# Patient Record
Sex: FEMALE | Race: OTHER | NOT HISPANIC OR LATINO | Employment: FULL TIME | URBAN - METROPOLITAN AREA
[De-identification: names, ages, dates, MRNs, and addresses within clinical notes are randomized per-mention and may not be internally consistent; named-entity substitution may affect disease eponyms.]

---

## 2020-10-18 ENCOUNTER — APPOINTMENT (OUTPATIENT)
Dept: RADIOLOGY | Facility: MEDICAL CENTER | Age: 55
DRG: 177 | End: 2020-10-18
Attending: EMERGENCY MEDICINE
Payer: COMMERCIAL

## 2020-10-18 ENCOUNTER — HOSPITAL ENCOUNTER (INPATIENT)
Facility: MEDICAL CENTER | Age: 55
LOS: 2 days | DRG: 177 | End: 2020-10-20
Attending: EMERGENCY MEDICINE | Admitting: HOSPITALIST
Payer: COMMERCIAL

## 2020-10-18 DIAGNOSIS — R09.02 HYPOXIA: ICD-10-CM

## 2020-10-18 DIAGNOSIS — R53.1 WEAKNESS: ICD-10-CM

## 2020-10-18 DIAGNOSIS — U07.1 COVID-19 VIRUS INFECTION: ICD-10-CM

## 2020-10-18 PROBLEM — R73.9 HYPERGLYCEMIA: Status: ACTIVE | Noted: 2020-10-18

## 2020-10-18 PROBLEM — J12.82 PNEUMONIA DUE TO COVID-19 VIRUS: Status: ACTIVE | Noted: 2020-10-18

## 2020-10-18 PROBLEM — E87.1 HYPONATREMIA: Status: ACTIVE | Noted: 2020-10-18

## 2020-10-18 LAB
ALBUMIN SERPL BCP-MCNC: 3.7 G/DL (ref 3.2–4.9)
ALBUMIN/GLOB SERPL: 1.2 G/DL
ALP SERPL-CCNC: 83 U/L (ref 30–99)
ALT SERPL-CCNC: 18 U/L (ref 2–50)
ANION GAP SERPL CALC-SCNC: 10 MMOL/L (ref 7–16)
AST SERPL-CCNC: 16 U/L (ref 12–45)
BASOPHILS # BLD AUTO: 0.1 % (ref 0–1.8)
BASOPHILS # BLD: 0.01 K/UL (ref 0–0.12)
BILIRUB SERPL-MCNC: 0.4 MG/DL (ref 0.1–1.5)
BUN SERPL-MCNC: 8 MG/DL (ref 8–22)
CALCIUM SERPL-MCNC: 8.6 MG/DL (ref 8.5–10.5)
CHLORIDE SERPL-SCNC: 98 MMOL/L (ref 96–112)
CO2 SERPL-SCNC: 25 MMOL/L (ref 20–33)
CREAT SERPL-MCNC: 0.54 MG/DL (ref 0.5–1.4)
CRP SERPL HS-MCNC: 18.05 MG/DL (ref 0–0.75)
D DIMER PPP IA.FEU-MCNC: 0.42 UG/ML (FEU) (ref 0–0.5)
EOSINOPHIL # BLD AUTO: 0.02 K/UL (ref 0–0.51)
EOSINOPHIL NFR BLD: 0.2 % (ref 0–6.9)
ERYTHROCYTE [DISTWIDTH] IN BLOOD BY AUTOMATED COUNT: 42.5 FL (ref 35.9–50)
GLOBULIN SER CALC-MCNC: 3.2 G/DL (ref 1.9–3.5)
GLUCOSE SERPL-MCNC: 114 MG/DL (ref 65–99)
HCT VFR BLD AUTO: 41.2 % (ref 37–47)
HGB BLD-MCNC: 13.8 G/DL (ref 12–16)
IMM GRANULOCYTES # BLD AUTO: 0.04 K/UL (ref 0–0.11)
IMM GRANULOCYTES NFR BLD AUTO: 0.5 % (ref 0–0.9)
LACTATE BLD-SCNC: 1.2 MMOL/L (ref 0.5–2)
LYMPHOCYTES # BLD AUTO: 1.24 K/UL (ref 1–4.8)
LYMPHOCYTES NFR BLD: 14.5 % (ref 22–41)
MCH RBC QN AUTO: 29.9 PG (ref 27–33)
MCHC RBC AUTO-ENTMCNC: 33.5 G/DL (ref 33.6–35)
MCV RBC AUTO: 89.2 FL (ref 81.4–97.8)
MONOCYTES # BLD AUTO: 0.32 K/UL (ref 0–0.85)
MONOCYTES NFR BLD AUTO: 3.7 % (ref 0–13.4)
NEUTROPHILS # BLD AUTO: 6.93 K/UL (ref 2–7.15)
NEUTROPHILS NFR BLD: 81 % (ref 44–72)
NRBC # BLD AUTO: 0 K/UL
NRBC BLD-RTO: 0 /100 WBC
PLATELET # BLD AUTO: 196 K/UL (ref 164–446)
PMV BLD AUTO: 9.2 FL (ref 9–12.9)
POTASSIUM SERPL-SCNC: 3.9 MMOL/L (ref 3.6–5.5)
PROCALCITONIN SERPL-MCNC: <0.05 NG/ML
PROT SERPL-MCNC: 6.9 G/DL (ref 6–8.2)
RBC # BLD AUTO: 4.62 M/UL (ref 4.2–5.4)
SODIUM SERPL-SCNC: 133 MMOL/L (ref 135–145)
WBC # BLD AUTO: 8.6 K/UL (ref 4.8–10.8)

## 2020-10-18 PROCEDURE — 86140 C-REACTIVE PROTEIN: CPT

## 2020-10-18 PROCEDURE — 770020 HCHG ROOM/CARE - TELE (206)

## 2020-10-18 PROCEDURE — 80053 COMPREHEN METABOLIC PANEL: CPT

## 2020-10-18 PROCEDURE — 99222 1ST HOSP IP/OBS MODERATE 55: CPT | Performed by: HOSPITALIST

## 2020-10-18 PROCEDURE — 36415 COLL VENOUS BLD VENIPUNCTURE: CPT

## 2020-10-18 PROCEDURE — A9270 NON-COVERED ITEM OR SERVICE: HCPCS | Performed by: HOSPITALIST

## 2020-10-18 PROCEDURE — 83605 ASSAY OF LACTIC ACID: CPT

## 2020-10-18 PROCEDURE — 96372 THER/PROPH/DIAG INJ SC/IM: CPT

## 2020-10-18 PROCEDURE — 99285 EMERGENCY DEPT VISIT HI MDM: CPT

## 2020-10-18 PROCEDURE — 87040 BLOOD CULTURE FOR BACTERIA: CPT | Mod: 91

## 2020-10-18 PROCEDURE — 83520 IMMUNOASSAY QUANT NOS NONAB: CPT

## 2020-10-18 PROCEDURE — 85025 COMPLETE CBC W/AUTO DIFF WBC: CPT

## 2020-10-18 PROCEDURE — 71045 X-RAY EXAM CHEST 1 VIEW: CPT

## 2020-10-18 PROCEDURE — 700102 HCHG RX REV CODE 250 W/ 637 OVERRIDE(OP): Performed by: HOSPITALIST

## 2020-10-18 PROCEDURE — 84145 PROCALCITONIN (PCT): CPT

## 2020-10-18 PROCEDURE — 700111 HCHG RX REV CODE 636 W/ 250 OVERRIDE (IP): Performed by: HOSPITALIST

## 2020-10-18 PROCEDURE — 85379 FIBRIN DEGRADATION QUANT: CPT

## 2020-10-18 RX ORDER — ACETAMINOPHEN 325 MG/1
650 TABLET ORAL EVERY 6 HOURS PRN
Status: DISCONTINUED | OUTPATIENT
Start: 2020-10-18 | End: 2020-10-20 | Stop reason: HOSPADM

## 2020-10-18 RX ORDER — ACETAMINOPHEN 325 MG/1
650 TABLET ORAL EVERY 6 HOURS PRN
Status: DISCONTINUED | OUTPATIENT
Start: 2020-10-18 | End: 2020-10-18

## 2020-10-18 RX ORDER — POLYETHYLENE GLYCOL 3350 17 G/17G
1 POWDER, FOR SOLUTION ORAL
Status: DISCONTINUED | OUTPATIENT
Start: 2020-10-18 | End: 2020-10-20 | Stop reason: HOSPADM

## 2020-10-18 RX ORDER — DEXAMETHASONE 6 MG/1
6 TABLET ORAL DAILY
Status: DISCONTINUED | OUTPATIENT
Start: 2020-10-18 | End: 2020-10-20 | Stop reason: HOSPADM

## 2020-10-18 RX ORDER — PROMETHAZINE HYDROCHLORIDE 25 MG/1
12.5-25 TABLET ORAL EVERY 4 HOURS PRN
Status: DISCONTINUED | OUTPATIENT
Start: 2020-10-18 | End: 2020-10-20 | Stop reason: HOSPADM

## 2020-10-18 RX ORDER — ONDANSETRON 4 MG/1
4 TABLET, ORALLY DISINTEGRATING ORAL EVERY 4 HOURS PRN
Status: DISCONTINUED | OUTPATIENT
Start: 2020-10-18 | End: 2020-10-18

## 2020-10-18 RX ORDER — AMOXICILLIN 250 MG
2 CAPSULE ORAL 2 TIMES DAILY
Status: DISCONTINUED | OUTPATIENT
Start: 2020-10-18 | End: 2020-10-20 | Stop reason: HOSPADM

## 2020-10-18 RX ORDER — BISACODYL 10 MG
10 SUPPOSITORY, RECTAL RECTAL
Status: DISCONTINUED | OUTPATIENT
Start: 2020-10-18 | End: 2020-10-20 | Stop reason: HOSPADM

## 2020-10-18 RX ORDER — ONDANSETRON 2 MG/ML
4 INJECTION INTRAMUSCULAR; INTRAVENOUS EVERY 4 HOURS PRN
Status: DISCONTINUED | OUTPATIENT
Start: 2020-10-18 | End: 2020-10-18

## 2020-10-18 RX ORDER — GUAIFENESIN/DEXTROMETHORPHAN 100-10MG/5
10 SYRUP ORAL EVERY 6 HOURS PRN
Status: DISCONTINUED | OUTPATIENT
Start: 2020-10-18 | End: 2020-10-20 | Stop reason: HOSPADM

## 2020-10-18 RX ORDER — ONDANSETRON 2 MG/ML
4 INJECTION INTRAMUSCULAR; INTRAVENOUS EVERY 6 HOURS PRN
Status: DISCONTINUED | OUTPATIENT
Start: 2020-10-18 | End: 2020-10-20 | Stop reason: HOSPADM

## 2020-10-18 RX ORDER — PROCHLORPERAZINE EDISYLATE 5 MG/ML
5-10 INJECTION INTRAMUSCULAR; INTRAVENOUS EVERY 4 HOURS PRN
Status: DISCONTINUED | OUTPATIENT
Start: 2020-10-18 | End: 2020-10-20 | Stop reason: HOSPADM

## 2020-10-18 RX ORDER — PROMETHAZINE HYDROCHLORIDE 25 MG/1
12.5-25 SUPPOSITORY RECTAL EVERY 4 HOURS PRN
Status: DISCONTINUED | OUTPATIENT
Start: 2020-10-18 | End: 2020-10-20 | Stop reason: HOSPADM

## 2020-10-18 RX ORDER — ONDANSETRON 4 MG/1
4 TABLET, ORALLY DISINTEGRATING ORAL EVERY 6 HOURS PRN
Status: DISCONTINUED | OUTPATIENT
Start: 2020-10-18 | End: 2020-10-20 | Stop reason: HOSPADM

## 2020-10-18 RX ADMIN — DEXAMETHASONE 6 MG: 6 TABLET ORAL at 12:31

## 2020-10-18 RX ADMIN — ACETAMINOPHEN 650 MG: 325 TABLET, FILM COATED ORAL at 20:53

## 2020-10-18 RX ADMIN — ENOXAPARIN SODIUM 40 MG: 40 INJECTION SUBCUTANEOUS at 12:31

## 2020-10-18 SDOH — HEALTH STABILITY: MENTAL HEALTH: HOW OFTEN DO YOU HAVE A DRINK CONTAINING ALCOHOL?: NEVER

## 2020-10-18 ASSESSMENT — COGNITIVE AND FUNCTIONAL STATUS - GENERAL
SUGGESTED CMS G CODE MODIFIER DAILY ACTIVITY: CH
MOBILITY SCORE: 24
DAILY ACTIVITIY SCORE: 24
SUGGESTED CMS G CODE MODIFIER MOBILITY: CH

## 2020-10-18 ASSESSMENT — LIFESTYLE VARIABLES
TOTAL SCORE: 0
TOTAL SCORE: 0
HAVE PEOPLE ANNOYED YOU BY CRITICIZING YOUR DRINKING: NO
TOTAL SCORE: 0
EVER FELT BAD OR GUILTY ABOUT YOUR DRINKING: NO
HOW MANY TIMES IN THE PAST YEAR HAVE YOU HAD 5 OR MORE DRINKS IN A DAY: 0
ALCOHOL_USE: NO
CONSUMPTION TOTAL: INCOMPLETE
CONSUMPTION TOTAL: NEGATIVE
HAVE PEOPLE ANNOYED YOU BY CRITICIZING YOUR DRINKING: NO
AVERAGE NUMBER OF DAYS PER WEEK YOU HAVE A DRINK CONTAINING ALCOHOL: 0
TOTAL SCORE: 0
HAVE YOU EVER FELT YOU SHOULD CUT DOWN ON YOUR DRINKING: NO
TOTAL SCORE: 0
EVER FELT BAD OR GUILTY ABOUT YOUR DRINKING: NO
EVER HAD A DRINK FIRST THING IN THE MORNING TO STEADY YOUR NERVES TO GET RID OF A HANGOVER: NO
DOES PATIENT WANT TO STOP DRINKING: NO
EVER HAD A DRINK FIRST THING IN THE MORNING TO STEADY YOUR NERVES TO GET RID OF A HANGOVER: NO
TOTAL SCORE: 0
DOES PATIENT WANT TO STOP DRINKING: NO
ON A TYPICAL DAY WHEN YOU DRINK ALCOHOL HOW MANY DRINKS DO YOU HAVE: 0
DO YOU DRINK ALCOHOL: NO
ALCOHOL_USE: NO
HAVE YOU EVER FELT YOU SHOULD CUT DOWN ON YOUR DRINKING: NO

## 2020-10-18 ASSESSMENT — PATIENT HEALTH QUESTIONNAIRE - PHQ9
1. LITTLE INTEREST OR PLEASURE IN DOING THINGS: NOT AT ALL
SUM OF ALL RESPONSES TO PHQ9 QUESTIONS 1 AND 2: 0
SUM OF ALL RESPONSES TO PHQ9 QUESTIONS 1 AND 2: 0
2. FEELING DOWN, DEPRESSED, IRRITABLE, OR HOPELESS: NOT AT ALL
1. LITTLE INTEREST OR PLEASURE IN DOING THINGS: NOT AT ALL
2. FEELING DOWN, DEPRESSED, IRRITABLE, OR HOPELESS: NOT AT ALL

## 2020-10-18 ASSESSMENT — PAIN DESCRIPTION - PAIN TYPE: TYPE: ACUTE PAIN

## 2020-10-18 NOTE — ED NOTES
Med Rec complete per phone interview with Pt's Son  Allergies Reviewed  No ABX in the last 14 days    Pt takes no medications

## 2020-10-18 NOTE — PROGRESS NOTES
Patient refused two RN skin assessment, educated regarding importance.  Education reinforced by COLE Simpson.  Patient continues to refuse.

## 2020-10-18 NOTE — PROGRESS NOTES
Isolation Precautions:    Patient is on Droplet, Contact and Eye protection isolation for COVID-19.    Patient educated on reason for isolation, how the infection may be transmitted, and how to help prevent transmission to others.     Patient educated that Droplet, Contact and Eye protection precautions involves staff and visitors wearing PPE, follow  Standard Precautions and perform meticulous hand hygiene in order to prevent transmission of infection. (Contact Precautions: gown and gloves; Special Contact Precautions: gown and gloves, with the added requirement of soap and water for hand hygiene; Droplet Precautions: surgical mask worn by staff and visitors in the room, and worn by the patient when out of the room; Airborne Precautions: involves staff wearing PPE to include an N95 Respirator or Controlled Air Purifying Respirator (CAPR).  Visitors should be limited and may wear an N95 mask).         Patient transport and mobilization on unit. Patient educated that they may leave their room, but prior to exiting, the patient needs to have on a fresh patient gown, ensure the potentially infectious area is covered, perform appropriate hand hygiene immediately prior to exiting the room. (*For Airborne Precautions: Patient educated that time out of the room should be minimized and limited to transport to diagnostic procedures or other activities. Patient is to wear surgical mask when required to leave the patient room).

## 2020-10-18 NOTE — PROGRESS NOTES
Pt arrived to unit A&Ox4 and on RA. VSS. Pt oriented to room and call bell. Spoke w/ patient's son Oskar and provided updates on the patient's health status. Pt ambulated to the bathroom steady on her feet. Continuous O2 and telemetry monitoring in use. Pt is comfortably sitting at bedside eating. Call bell within reach. Will continue to monitor.

## 2020-10-18 NOTE — PROGRESS NOTES
[facility] TRI[facility]IC[facility] ER REPORT  Consult/Admission requested by: Dr Davalos   Chief complaint:SOB   Pertinent history/ER Course:   Hx of Covid test +7 days, presented with worsening weakness and shortness of breath, pulse oximeter at home showed saturation less than 90s, chest x-ray showed bilateral infiltration, [facility]    Code Status: Full per ERP, I personally verified with the ERP patient's code status and the ERP has confirmed this with the patient.   Patient meets admission criterion: Yes..  Recommendations given or work up & consultations requested per triage officer: None  Consultants involved and pertinent input from consultants: None  Admission status: Inpatient.   Admission order placed: Yes.   Floor requested: COVID-19 floor  Assigned hospitalist: Dr Sarah Posadas

## 2020-10-18 NOTE — LETTER
10/23/2020               Raymon Damico  [address]  [facility] [address]        Dear Raymon (MR#[id number]),    This letter is to inform you that your COVID-19 test result is POSITIVE.  This means that the virus that causes COVID-19 was found in your sample.      The Health Department will be in contact with you soon.      You are encouraged to continue to quarantine and self-isolate according to the CDC guidance unless otherwise directed by the Health Department.  Per the CDC, you should continue to quarantine until at least 10 days have passed since your symptoms first appeared and at least 24 hours have passed since your fever resolved without the use of fever-reducing medications. Your other symptoms of COVID-19 should also be improving (loss of taste and smell may persist for weeks or months after recovery and should not delay the end of isolation).    Once any symptoms have resolved, it may be possible to donate plasma to help others that are currently ill with COVID-19. To learn more and apply, please contact the  at [phone] or via e-mail at [email or web address].    For any further questions regarding COVID-19, please contact the [facility] at [phone].  Thank you for your cooperation in the matter.      Sincerely,      The [facility] Care Team

## 2020-10-18 NOTE — ED NOTES
Medicated per MAR. Son updated regarding POC, pt status, and all questions addressed via telephone with pt permission.

## 2020-10-18 NOTE — H&P
Hospital Medicine History & Physical Note    Date of Service  10/18/2020    Primary Care Physician  Paty Hannon M.D. (Inactive)    Consultants      Code Status  Full Code    Chief Complaint  Chief Complaint   Patient presents with   • Hypoxemia   • Coronavirus Screening       History of Presenting Illness  55 y.o. female who presented 10/18/2020 with recent diagnosis of COVID-19 8 days ago has been having cough mild dyspnea was monitoring her pulse oximetry at home and noticed last night that her oxygen saturation dropped to 80 to 84%.  She denies any chest pain.  Cough is nonproductive.  No hemoptysis.  She is concerned about her hypoxia and presented to the emergency department.  She is afebrile no chills.  No diarrhea.  No nausea vomiting    Review of Systems  Review of Systems   All other systems reviewed and are negative.      Past Medical History  Negative per patient    Surgical History  Negative per patient    Family History  Reviewed not pertinent to the presenting problem    Social History   reports that she has never smoked. She has never used smokeless tobacco. She reports that she does not drink alcohol or use drugs.    Allergies  Allergies   Allergen Reactions   • Aspirin Itching and Swelling       Medications  None       Physical Exam  Temp:  [37.1 °C (98.7 °F)] 37.1 °C (98.7 °F)  Pulse:  [71-87] 71  Resp:  [16-35] 22  BP: (111-123)/(57-65) 116/58  SpO2:  [90 %-95 %] 95 %    Physical Exam  Vitals signs and nursing note reviewed.   Constitutional:       General: She is not in acute distress.  HENT:      Head: Normocephalic and atraumatic.      Nose: Nose normal.      Mouth/Throat:      Pharynx: No oropharyngeal exudate or posterior oropharyngeal erythema.   Eyes:      General:         Right eye: No discharge.         Left eye: No discharge.   Neck:      Musculoskeletal: Neck supple.   Cardiovascular:      Rate and Rhythm: Normal rate and regular rhythm.      Heart sounds: No murmur. No friction  rub. No gallop.    Pulmonary:      Effort: Pulmonary effort is normal. No respiratory distress.      Breath sounds: No stridor. Decreased breath sounds present. No wheezing or rhonchi.   Chest:      Chest wall: No tenderness.   Abdominal:      General: Bowel sounds are normal. There is no distension.      Palpations: Abdomen is soft. There is no mass.      Tenderness: There is no abdominal tenderness. There is no guarding.   Musculoskeletal:         General: No swelling or tenderness.   Skin:     General: Skin is warm and dry.      Coloration: Skin is not cyanotic.      Nails: There is no clubbing.     Neurological:      General: No focal deficit present.      Mental Status: She is alert and oriented to person, place, and time.      Cranial Nerves: No cranial nerve deficit.      Motor: No weakness.   Psychiatric:         Mood and Affect: Mood normal.         Behavior: Behavior normal.         Laboratory:  Recent Labs     10/18/20  0818   WBC 8.6   RBC 4.62   HEMOGLOBIN 13.8   HEMATOCRIT 41.2   MCV 89.2   MCH 29.9   MCHC 33.5*   RDW 42.5   PLATELETCT 196   MPV 9.2     Recent Labs     10/18/20  0818   SODIUM 133*   POTASSIUM 3.9   CHLORIDE 98   CO2 25   GLUCOSE 114*   BUN 8   CREATININE 0.54   CALCIUM 8.6     Recent Labs     10/18/20  0818   ALTSGPT 18   ASTSGOT 16   ALKPHOSPHAT 83   TBILIRUBIN 0.4   GLUCOSE 114*         No results for input(s): NTPROBNP in the last 72 hours.      No results for input(s): TROPONINT in the last 72 hours.    Imaging:  DX-CHEST-PORTABLE (1 VIEW)   Final Result         Diffuse interstitial prominence and hazy opacity throughout both lungs could relate to atypical infection such as Covid 19 pneumonia            Assessment/Plan:  I anticipate this patient will require at least two midnights for appropriate medical management, necessitating inpatient admission.    * Pneumonia due to COVID-19 virus  Assessment & Plan  Given her hypoxia and duration of symptoms will start on Decadron 6 mg  daily  Close clinical monitoring and supportive care  Monitor pulse oximetry and oxygen supplementation to keep saturation greater than 90%      Hyponatremia  Assessment & Plan  Mild given COVID pneumonia avoid excessive IV hydration will monitor levels    Hyperglycemia  Assessment & Plan  Check fasting level    Hypoxia  Assessment & Plan  Her oxygen saturation dropped to the low 80s she is currently improved and is off oxygen  Close clinical monitoring

## 2020-10-18 NOTE — ED PROVIDER NOTES
ED Provider Note    Scribed for Law Davalos M.D. by Krystyna Dawson. 10/18/2020  8:18 AM    Primary care provider: Paty Hannon M.D. (Inactive)  Means of arrival: Walk in  History obtained from: Patient  History limited by: None    CHIEF COMPLAINT  Chief Complaint   Patient presents with   • Hypoxemia   • Coronavirus Screening       [facility]  Raymon Damico is a 55 y.o. female who presents to the Emergency Department for ongoing shortness of breath with an onset of one week ago, but worsening the last few days. The patient notes she was tested positive for COVID-19 approximately 7-8 days ago. She adds she has been measuring her oxygen saturation at home and noticed her oxygen decreased to 80 to 84 percent at home. She also notes she has been having difficulty breathing out of her nose. Breathing out of her mouth is a mild alleviating factor. Exertion is an exacerbating factor. She reports associated loss of smell and taste, and generalized weakness. She denies any associated diarrhea, or fever.     I verified that the patient was wearing a mask and I was wearing appropriate PPE every time I entered the room. The patient's mask was on the patient at all times during my encounter except for a brief view of the oropharynx.    REVIEW OF SYSTEMS  Pertinent negatives include no diarrhea, or fever. As above, all other systems reviewed and are negative.   See HPI for further details.     PAST MEDICAL HISTORY   None noted    SURGICAL HISTORY  patient denies any surgical history    SOCIAL HISTORY  Social History     Tobacco Use   • Smoking status: None noted   Substance Use Topics   • Alcohol use: None noted   • Drug use: None noted      Social History     Substance and Sexual Activity   Drug Use None noted       FAMILY HISTORY  None noted    CURRENT MEDICATIONS  Home Medications     Reviewed by Jostin Kwong (Pharmacy Tech) on 10/18/20 at 0929  Med List Status: Complete   Medication Last Dose Status       "  Patient Greg Taking any Medications                       ALLERGIES  Allergies   Allergen Reactions   • Aspirin Itching and Swelling       PHYSICAL EXAM  VITAL SIGNS: /65   Pulse 87   Temp 37.1 °C (98.7 °F) (Temporal)   Resp 16   Ht 1.651 m (5' 5\")   Wt 77.1 kg (169 lb 15.6 oz)   SpO2 92%   BMI 28.29 kg/m²     Constitutional: Well developed, Well nourished, Comfortable appearing, No acute distress, Non-toxic appearance.   HENT: Normocephalic, Atraumatic, Bilateral external ears normal, Oropharynx is clear mucous membranes are moist. No oral exudates or nasal discharge.   Eyes: Pupils are equal round and reactive, EOMI, Conjunctiva normal, No discharge.   Neck: Normal range of motion, No tenderness, Supple, No stridor. No meningismus.  Lymphatic: No lymphadenopathy noted.   Cardiovascular: Regular rate and rhythm without murmur rub or gallop.  Thorax & Lungs: Slight increase work of breathing, Clear breath sounds bilaterally without wheezes, rhonchi or rales. There is no chest wall tenderness.   Abdomen: Soft non-tender non-distended. There is no rebound or guarding. No organomegaly is appreciated. Bowel sounds are normal.  Skin: Normal without rash.   Back: No CVA or spinal tenderness.   Extremities: Intact distal pulses, No edema, No tenderness, No cyanosis, No clubbing. Capillary refill is less than 2 seconds.  Musculoskeletal: Good range of motion in all major joints. No tenderness to palpation or major deformities noted.   Neurologic: Alert & oriented x 3, Normal motor function, Normal sensory function, No focal deficits noted. Reflexes are normal.  Psychiatric: Affect normal, Judgment normal, Mood normal. There is no suicidal ideation or patient reported hallucinations.       DIAGNOSTIC STUDIES / PROCEDURES    LABS  Labs Reviewed   CBC WITH DIFFERENTIAL - Abnormal; Notable for the following components:       Result Value    MCHC 33.5 (*)     Neutrophils-Polys 81.00 (*)     Lymphocytes 14.50 (*)  " "   All other components within normal limits   COMP METABOLIC PANEL - Abnormal; Notable for the following components:    Sodium 133 (*)     Glucose 114 (*)     All other components within normal limits   LACTIC ACID   BLOOD CULTURE    Narrative:     Per Hospital Policy: Only change Specimen Src: to \"Line\" if  specified by physician order.   BLOOD CULTURE    Narrative:     Per Hospital Policy: Only change Specimen Src: to \"Line\" if  specified by physician order.   ESTIMATED GFR   LACTIC ACID   LACTIC ACID   URINALYSIS   URINE CULTURE(NEW)   D-DIMER   CRP QUANTITIVE (NON-CARDIAC)   PROCALCITONIN   INTERLEUKIN 6   :   All labs reviewed by me.    RADIOLOGY  DX-CHEST-PORTABLE (1 VIEW)   Final Result         Diffuse interstitial prominence and hazy opacity throughout both lungs could relate to atypical infection such as Covid 19 pneumonia        The radiologist's interpretation of all radiological studies have been reviewed by me.    COURSE & MEDICAL DECISION MAKING  Nursing notes, VS, PMSFHx reviewed in chart.    8:18 AM Patient seen and examined at bedside. Discussed plan of care with patient. I informed them that labs and imaging will be ordered to evaluate symptoms. Patient is understanding and agreeable with plan. Ordered for labs and DX chest to evaluate.  There was no need to do additional coronavirus testing as the patient is known positive a week ago    8:50 AM - Reviewed lab results which showed a lactate of 1.2.  She is not in significant distress and chest x-ray shows diffuse interstitial prominence and hazy opacities consistent with atypical pneumonia.  I think this is advancing coronavirus illness    Laboratory evaluation reveals no leukocytosis, minimal shift with 81% PMNs and no evidence of acidosis, renal or liver dysfunction and unremarkable electrolytes with only a slightly low sodium at 133    9:18 AM - Paged hospitalist.     9:39 AM - I discussed the patient's case and the above findings with  " Renee (hospitalist) who agreed to evaluate the patient for hospitalization.  Patient understands her need for hospitalization.  She understands that her coronavirus infection is likely going to get worse before it improves and we are hospitalizing her for ongoing close monitoring    PPE Note: I personally donned full PPE for all patient encounters during this visit, including being clean-shaven with an N95 respirator mask, gloves, gown, and goggles.     Scribe remained outside the patient's room and did not have any contact with the patient for the duration of patient encounter.       DISPOSITION:  Patient will be hospitalized by Dr. Duran in guarded condition.    FINAL IMPRESSION  1. COVID-19 virus infection    2. Hypoxia    3. Weakness          I, Krystyna Dawson (Scribe), am scribing for, and in the presence of, Law Davalos M.D..    Electronically signed by: Krystyna Dawson (Scribe), 10/18/2020    ILaw M.D. personally performed the services described in this documentation, as scribed by Krystyna Dawson in my presence, and it is both accurate and complete.    C    The note accurately reflects work and decisions made by me.  Law Davalos M.D.  10/18/2020  10:15 AM

## 2020-10-18 NOTE — ASSESSMENT & PLAN NOTE
Continue decadron  Vitamin D levels pending  D dimer low, no indication for full anticoagulation  Zinc, vitamin C, pepcid and melatonin started for immune support  Continue supportive care

## 2020-10-19 LAB
25(OH)D3 SERPL-MCNC: 41 NG/ML (ref 30–100)
COVID ORDER STATUS COVID19: NORMAL
SARS-COV-2 RNA RESP QL NAA+PROBE: DETECTED
SPECIMEN SOURCE: ABNORMAL

## 2020-10-19 PROCEDURE — C9803 HOPD COVID-19 SPEC COLLECT: HCPCS | Performed by: HOSPITALIST

## 2020-10-19 PROCEDURE — 99232 SBSQ HOSP IP/OBS MODERATE 35: CPT | Performed by: HOSPITALIST

## 2020-10-19 PROCEDURE — 82306 VITAMIN D 25 HYDROXY: CPT

## 2020-10-19 PROCEDURE — A9270 NON-COVERED ITEM OR SERVICE: HCPCS | Performed by: HOSPITALIST

## 2020-10-19 PROCEDURE — U0003 INFECTIOUS AGENT DETECTION BY NUCLEIC ACID (DNA OR RNA); SEVERE ACUTE RESPIRATORY SYNDROME CORONAVIRUS 2 (SARS-COV-2) (CORONAVIRUS DISEASE [COVID-19]), AMPLIFIED PROBE TECHNIQUE, MAKING USE OF HIGH THROUGHPUT TECHNOLOGIES AS DESCRIBED BY CMS-2020-01-R: HCPCS

## 2020-10-19 PROCEDURE — 770020 HCHG ROOM/CARE - TELE (206)

## 2020-10-19 PROCEDURE — 700102 HCHG RX REV CODE 250 W/ 637 OVERRIDE(OP): Performed by: HOSPITALIST

## 2020-10-19 PROCEDURE — 36415 COLL VENOUS BLD VENIPUNCTURE: CPT

## 2020-10-19 PROCEDURE — 700111 HCHG RX REV CODE 636 W/ 250 OVERRIDE (IP): Performed by: HOSPITALIST

## 2020-10-19 RX ORDER — ZINC SULFATE 50(220)MG
220 CAPSULE ORAL DAILY
Status: DISCONTINUED | OUTPATIENT
Start: 2020-10-19 | End: 2020-10-20 | Stop reason: HOSPADM

## 2020-10-19 RX ORDER — CHOLECALCIFEROL (VITAMIN D3) 125 MCG
5 CAPSULE ORAL NIGHTLY
Status: DISCONTINUED | OUTPATIENT
Start: 2020-10-19 | End: 2020-10-20 | Stop reason: HOSPADM

## 2020-10-19 RX ORDER — ASCORBIC ACID 500 MG
1000 TABLET ORAL 2 TIMES DAILY
Status: DISCONTINUED | OUTPATIENT
Start: 2020-10-19 | End: 2020-10-20 | Stop reason: HOSPADM

## 2020-10-19 RX ADMIN — OXYCODONE HYDROCHLORIDE AND ACETAMINOPHEN 1000 MG: 500 TABLET ORAL at 09:02

## 2020-10-19 RX ADMIN — DOCUSATE SODIUM 50 MG AND SENNOSIDES 8.6 MG 2 TABLET: 8.6; 5 TABLET, FILM COATED ORAL at 17:02

## 2020-10-19 RX ADMIN — Medication 5 MG: at 20:51

## 2020-10-19 RX ADMIN — DEXAMETHASONE 6 MG: 6 TABLET ORAL at 05:17

## 2020-10-19 RX ADMIN — ZINC SULFATE 220 MG (50 MG) CAPSULE 220 MG: CAPSULE at 09:02

## 2020-10-19 RX ADMIN — OXYCODONE HYDROCHLORIDE AND ACETAMINOPHEN 1000 MG: 500 TABLET ORAL at 17:02

## 2020-10-19 ASSESSMENT — ENCOUNTER SYMPTOMS
CONSTITUTIONAL NEGATIVE: 1
DIZZINESS: 0
DIAPHORESIS: 0
BRUISES/BLEEDS EASILY: 0
PALPITATIONS: 0
SHORTNESS OF BREATH: 1
EYES NEGATIVE: 1
SORE THROAT: 0
WEIGHT LOSS: 0
FOCAL WEAKNESS: 0
FEVER: 0
ABDOMINAL PAIN: 0
WEAKNESS: 0
DEPRESSION: 0
MUSCULOSKELETAL NEGATIVE: 1
HEADACHES: 0
COUGH: 1
VOMITING: 0
MYALGIAS: 0
NAUSEA: 0
NEUROLOGICAL NEGATIVE: 1
GASTROINTESTINAL NEGATIVE: 1
BLURRED VISION: 0
PSYCHIATRIC NEGATIVE: 1
CARDIOVASCULAR NEGATIVE: 1
CHILLS: 0

## 2020-10-19 ASSESSMENT — LIFESTYLE VARIABLES: SUBSTANCE_ABUSE: 0

## 2020-10-19 NOTE — PROGRESS NOTES
Patient is currently on room airin the 90's, but states that she has seen her 02 drop to 89 a couple of times and is requesting that she be placed oh 1 L NC for comfort for the night. She currently has it at bedside and said she would only put it on if she continued to see her 02 sats drop and felt SOB. Will continue to monitor.

## 2020-10-19 NOTE — CARE PLAN
Problem: Communication  Goal: The ability to communicate needs accurately and effectively will improve  Outcome: PROGRESSING AS EXPECTED     Problem: Safety  Goal: Will remain free from injury  Outcome: PROGRESSING AS EXPECTED     Problem: Infection  Goal: Will remain free from infection  Outcome: PROGRESSING AS EXPECTED     Problem: Discharge Barriers/Planning  Goal: Patient's continuum of care needs will be met  Outcome: PROGRESSING AS EXPECTED     Problem: Pain Management  Goal: Pain level will decrease to patient's comfort goal  Outcome: PROGRESSING AS EXPECTED     Problem: Respiratory:  Goal: Respiratory status will improve  Outcome: PROGRESSING AS EXPECTED

## 2020-10-19 NOTE — PROGRESS NOTES
[facility] Medicine Daily Progress Note    Date of Service  10/19/2020    Chief Complaint  55 y.o. female admitted 10/18/2020 with shortness of breath    Hospital Course    Patient is a pleasant 55 year old woman with no significant past medical history who presented to the ER on 10/18/20 after a several day history of cough and sob.  She was found to be hypoxic with a room air saturation of 80-84% and COVID 19 test was positive.      Interval Problem Update  She reported mild epistaxis last night which resolved - humidified placed today.  She reports she is feeling better, sob improving and cough improving.  She denies pain, no GI symptoms.  Ros otherwise negative  axox3  Discussed with nursing and case management    Consultants/Specialty      Code Status  Full Code    Disposition  tbd    Review of Systems  Review of Systems   Constitutional: Negative.  Negative for chills, diaphoresis, fever, malaise/fatigue and weight loss.   HENT: Negative.  Negative for sore throat.    Eyes: Negative.  Negative for blurred vision.   Respiratory: Positive for cough and shortness of breath.    Cardiovascular: Negative.  Negative for chest pain, palpitations and leg swelling.   Gastrointestinal: Negative.  Negative for abdominal pain, nausea and vomiting.   Genitourinary: Negative.  Negative for dysuria.   Musculoskeletal: Negative.  Negative for myalgias.   Skin: Negative.  Negative for itching and rash.   Neurological: Negative.  Negative for dizziness, focal weakness, weakness and headaches.   Endo/Heme/Allergies: Negative.  Does not bruise/bleed easily.   Psychiatric/Behavioral: Negative.  Negative for depression, substance abuse and suicidal ideas.   All other systems reviewed and are negative.       Physical Exam  Temp:  [35.9 °C (96.7 °F)-36.8 °C (98.2 °F)] 36.2 °C (97.2 °F)  Pulse:  [63-83] 74  Resp:  [17-20] 19  BP: (109-127)/(52-89) 115/54  SpO2:  [93 %-95 %] 93 %    Physical Exam  Vitals signs and nursing note reviewed.  Exam conducted with a chaperone present.   Constitutional:       General: She is not in acute distress.     Appearance: Normal appearance. She is not diaphoretic.   HENT:      Head: Normocephalic.      Nose: Nose normal.      Mouth/Throat:      Mouth: Mucous membranes are moist.   Eyes:      Pupils: Pupils are equal, round, and reactive to light.   Cardiovascular:      Rate and Rhythm: Normal rate and regular rhythm.      Pulses: Normal pulses.      Heart sounds: Normal heart sounds.   Pulmonary:      Effort: Pulmonary effort is normal.      Comments: Decreased at bases  Abdominal:      General: Abdomen is flat. Bowel sounds are normal.      Palpations: Abdomen is soft.   Musculoskeletal: Normal range of motion.         General: No swelling or deformity.   Skin:     General: Skin is warm and dry.      Capillary Refill: Capillary refill takes less than 2 seconds.   Neurological:      General: No focal deficit present.      Mental Status: She is alert and oriented to person, place, and time.      Cranial Nerves: No cranial nerve deficit.   Psychiatric:         Mood and Affect: Mood normal.         Behavior: Behavior normal.         Fluids  No intake or output data in the 24 hours ending 10/19/20 1408    Laboratory  Recent Labs     10/18/20  0818   WBC 8.6   RBC 4.62   HEMOGLOBIN 13.8   HEMATOCRIT 41.2   MCV 89.2   MCH 29.9   MCHC 33.5*   RDW 42.5   PLATELETCT 196   MPV 9.2     Recent Labs     10/18/20  0818   SODIUM 133*   POTASSIUM 3.9   CHLORIDE 98   CO2 25   GLUCOSE 114*   BUN 8   CREATININE 0.54   CALCIUM 8.6                   Imaging  DX-CHEST-PORTABLE (1 VIEW)   Final Result         Diffuse interstitial prominence and hazy opacity throughout both lungs could relate to atypical infection such as Covid 19 pneumonia           Assessment/Plan  * Pneumonia due to COVID-19 virus  Assessment & Plan  Continue decadron  Vitamin D levels pending  D dimer low, no indication for full anticoagulation  Zinc, vitamin C, pepcid  and melatonin started for immune support  Continue supportive care      Hyponatremia  Assessment & Plan  Mild   given COVID pneumonia avoid IV hydration   following    Hyperglycemia  Assessment & Plan  Mild  following    Hypoxia  Assessment & Plan  Wean down oxygen as tolerated  Continue decadron  Following  Due to COVID 19 pneumonia       VTE prophylaxis:   lovenox

## 2020-10-20 VITALS
WEIGHT: 169.97 LBS | DIASTOLIC BLOOD PRESSURE: 59 MMHG | SYSTOLIC BLOOD PRESSURE: 114 MMHG | TEMPERATURE: 97.1 F | HEIGHT: 65 IN | RESPIRATION RATE: 18 BRPM | HEART RATE: 65 BPM | BODY MASS INDEX: 28.32 KG/M2 | OXYGEN SATURATION: 94 %

## 2020-10-20 PROBLEM — R73.9 HYPERGLYCEMIA: Status: RESOLVED | Noted: 2020-10-18 | Resolved: 2020-10-20

## 2020-10-20 PROBLEM — R09.02 HYPOXIA: Status: RESOLVED | Noted: 2020-10-18 | Resolved: 2020-10-20

## 2020-10-20 PROBLEM — E87.1 HYPONATREMIA: Status: RESOLVED | Noted: 2020-10-18 | Resolved: 2020-10-20

## 2020-10-20 LAB
ANION GAP SERPL CALC-SCNC: 10 MMOL/L (ref 7–16)
BASOPHILS # BLD AUTO: 0.1 % (ref 0–1.8)
BASOPHILS # BLD: 0.01 K/UL (ref 0–0.12)
BUN SERPL-MCNC: 18 MG/DL (ref 8–22)
CALCIUM SERPL-MCNC: 8.8 MG/DL (ref 8.5–10.5)
CHLORIDE SERPL-SCNC: 102 MMOL/L (ref 96–112)
CO2 SERPL-SCNC: 27 MMOL/L (ref 20–33)
CREAT SERPL-MCNC: 0.54 MG/DL (ref 0.5–1.4)
EOSINOPHIL # BLD AUTO: 0 K/UL (ref 0–0.51)
EOSINOPHIL NFR BLD: 0 % (ref 0–6.9)
ERYTHROCYTE [DISTWIDTH] IN BLOOD BY AUTOMATED COUNT: 42.6 FL (ref 35.9–50)
GLUCOSE SERPL-MCNC: 153 MG/DL (ref 65–99)
HCT VFR BLD AUTO: 41.6 % (ref 37–47)
HGB BLD-MCNC: 13.4 G/DL (ref 12–16)
IL6 SERPL-MCNC: 4.7 PG/ML
IMM GRANULOCYTES # BLD AUTO: 0.09 K/UL (ref 0–0.11)
IMM GRANULOCYTES NFR BLD AUTO: 0.8 % (ref 0–0.9)
LYMPHOCYTES # BLD AUTO: 0.65 K/UL (ref 1–4.8)
LYMPHOCYTES NFR BLD: 6 % (ref 22–41)
MCH RBC QN AUTO: 28.9 PG (ref 27–33)
MCHC RBC AUTO-ENTMCNC: 32.2 G/DL (ref 33.6–35)
MCV RBC AUTO: 89.8 FL (ref 81.4–97.8)
MONOCYTES # BLD AUTO: 0.19 K/UL (ref 0–0.85)
MONOCYTES NFR BLD AUTO: 1.8 % (ref 0–13.4)
NEUTROPHILS # BLD AUTO: 9.9 K/UL (ref 2–7.15)
NEUTROPHILS NFR BLD: 91.3 % (ref 44–72)
NRBC # BLD AUTO: 0 K/UL
NRBC BLD-RTO: 0 /100 WBC
PLATELET # BLD AUTO: 274 K/UL (ref 164–446)
PMV BLD AUTO: 9.7 FL (ref 9–12.9)
POTASSIUM SERPL-SCNC: 4 MMOL/L (ref 3.6–5.5)
RBC # BLD AUTO: 4.63 M/UL (ref 4.2–5.4)
SODIUM SERPL-SCNC: 139 MMOL/L (ref 135–145)
WBC # BLD AUTO: 10.8 K/UL (ref 4.8–10.8)

## 2020-10-20 PROCEDURE — A9270 NON-COVERED ITEM OR SERVICE: HCPCS | Performed by: HOSPITALIST

## 2020-10-20 PROCEDURE — 99239 HOSP IP/OBS DSCHRG MGMT >30: CPT | Performed by: HOSPITALIST

## 2020-10-20 PROCEDURE — 700102 HCHG RX REV CODE 250 W/ 637 OVERRIDE(OP): Performed by: HOSPITALIST

## 2020-10-20 PROCEDURE — 36415 COLL VENOUS BLD VENIPUNCTURE: CPT

## 2020-10-20 PROCEDURE — 85025 COMPLETE CBC W/AUTO DIFF WBC: CPT

## 2020-10-20 PROCEDURE — 700111 HCHG RX REV CODE 636 W/ 250 OVERRIDE (IP): Performed by: HOSPITALIST

## 2020-10-20 PROCEDURE — 80048 BASIC METABOLIC PNL TOTAL CA: CPT

## 2020-10-20 RX ORDER — DEXAMETHASONE 6 MG/1
6 TABLET ORAL DAILY
Qty: 30 TAB | Refills: 0 | Status: SHIPPED | OUTPATIENT
Start: 2020-10-21 | End: 2020-10-27

## 2020-10-20 RX ORDER — ZINC SULFATE 50(220)MG
220 CAPSULE ORAL DAILY
Qty: 15 CAP | Refills: 0 | Status: SHIPPED | OUTPATIENT
Start: 2020-10-21 | End: 2020-11-05

## 2020-10-20 RX ADMIN — OXYCODONE HYDROCHLORIDE AND ACETAMINOPHEN 1000 MG: 500 TABLET ORAL at 04:35

## 2020-10-20 RX ADMIN — DEXAMETHASONE 6 MG: 6 TABLET ORAL at 04:35

## 2020-10-20 RX ADMIN — ZINC SULFATE 220 MG (50 MG) CAPSULE 220 MG: CAPSULE at 04:35

## 2020-10-20 RX ADMIN — ENOXAPARIN SODIUM 40 MG: 40 INJECTION SUBCUTANEOUS at 04:39

## 2020-10-20 NOTE — CARE PLAN
Problem: Communication  Goal: The ability to communicate needs accurately and effectively will improve  Outcome: PROGRESSING AS EXPECTED     Problem: Safety  Goal: Will remain free from injury  Outcome: PROGRESSING AS EXPECTED  Goal: Will remain free from falls  Outcome: PROGRESSING AS EXPECTED     Problem: Infection  Goal: Will remain free from infection  Outcome: PROGRESSING AS EXPECTED     Problem: Venous Thromboembolism (VTW)/Deep Vein Thrombosis (DVT) Prevention:  Goal: Patient will participate in Venous Thrombosis (VTE)/Deep Vein Thrombosis (DVT)Prevention Measures  Outcome: PROGRESSING AS EXPECTED     Problem: Bowel/Gastric:  Goal: Normal bowel function is maintained or improved  Outcome: PROGRESSING AS EXPECTED  Goal: Will not experience complications related to bowel motility  Outcome: PROGRESSING AS EXPECTED     Problem: Knowledge Deficit  Goal: Knowledge of disease process/condition, treatment plan, diagnostic tests, and medications will improve  Outcome: PROGRESSING AS EXPECTED  Goal: Knowledge of the prescribed therapeutic regimen will improve  Outcome: PROGRESSING AS EXPECTED     Problem: Discharge Barriers/Planning  Goal: Patient's continuum of care needs will be met  Outcome: PROGRESSING AS EXPECTED     Problem: Pain Management  Goal: Pain level will decrease to patient's comfort goal  Outcome: PROGRESSING AS EXPECTED     Problem: Respiratory:  Goal: Respiratory status will improve  Outcome: PROGRESSING AS EXPECTED     Problem: Mobility  Goal: Risk for activity intolerance will decrease  Outcome: PROGRESSING AS EXPECTED

## 2020-10-20 NOTE — PROGRESS NOTES
Patient is requesting 02 to be turned on. Sats while sleeping range from 87-89 while awake on room air sats are in the 90's. NC placed at 0.5 as requested by patient. Will  Continue to monitor.

## 2020-10-20 NOTE — DISCHARGE SUMMARY
Discharge Summary    CHIEF COMPLAINT ON ADMISSION  Chief Complaint   Patient presents with   • Hypoxemia   • Coronavirus Screening       Reason for Admission  SOB     Admission Date  10/18/2020    CODE STATUS  Full Code    HPI & HOSPITAL COURSE  Patient is a pleasant 55 year old woman with no significant past medical history who presented to the ER on 10/18/20 after a several day history of cough and sob.  She was found to be hypoxic with a room air saturation of 80-84% and COVID 19 test was positive. She was admitted and put on NC 2L, labs were monitored, electrolytes were replaced as needed. She was started on steroids. D-dimer was low  Supportive care was given  She was encouraged to self prone    At this time she is doing well and have been successfully weaned off NC O2, she is encouraged to fu with pcp and get a sleep study outpatient since there are some concerns about sleep apnea. Patient understood and agreed with the above plan       Therefore, she is discharged in fair and stable condition to home with close outpatient follow-up.    The patient met 2-midnight criteria for an inpatient stay at the time of discharge.    Discharge Date  10/20/20    FOLLOW UP ITEMS POST DISCHARGE  pcp    DISCHARGE DIAGNOSES  Principal Problem:    Pneumonia due to COVID-19 virus POA: Unknown  Resolved Problems:    Hypoxia POA: Unknown    Hyperglycemia POA: Unknown    Hyponatremia POA: Unknown      FOLLOW UP  No future appointments.  No follow-up provider specified.    MEDICATIONS ON DISCHARGE     Medication List      START taking these medications      Instructions   ascorbic acid 1000 MG tablet  Commonly known as: VITAMIN C   Take 1 Tab by mouth 2 Times a Day.  Dose: 1,000 mg     dexamethasone 6 MG Tabs  Start taking on: October 21, 2020  Commonly known as: DECADRON   Take 1 Tab by mouth every day for 6 days.  Dose: 6 mg     zinc sulfate 220 (50 Zn) MG Caps  Start taking on: October 21, 2020  Commonly known as: ZINCATE   Take  1 Cap by mouth every day for 15 days.  Dose: 220 mg            Allergies  Allergies   Allergen Reactions   • Aspirin Itching and Swelling       DIET  Orders Placed This Encounter   Procedures   • Diet Order Cardiac, Regular     Standing Status:   Standing     Number of Occurrences:   1     Order Specific Question:   Diet:     Answer:   Cardiac [6]     Order Specific Question:   Diet:     Answer:   Regular [1]     Order Specific Question:   Miscellaneous modifications:     Answer:   Vegetarian [13]       ACTIVITY  As tolerated.  Weight bearing as tolerated    CONSULTATIONS  none    PROCEDURES  none    LABORATORY  Lab Results   Component Value Date    SODIUM 139 10/20/2020    POTASSIUM 4.0 10/20/2020    CHLORIDE 102 10/20/2020    CO2 27 10/20/2020    GLUCOSE 153 (H) 10/20/2020    BUN 18 10/20/2020    CREATININE 0.54 10/20/2020        Lab Results   Component Value Date    WBC 10.8 10/20/2020    HEMOGLOBIN 13.4 10/20/2020    HEMATOCRIT 41.6 10/20/2020    PLATELETCT 274 10/20/2020        Total time of the discharge process exceeds 45 minutes.

## 2020-10-20 NOTE — CARE PLAN

## 2020-10-20 NOTE — DISCHARGE PLANNING
Anticipated Discharge Disposition: Home    Action: Discussed discharge planning needs during rounds. Per MD, pt is medically cleared for discharge, no discharge needs from Case Management. Per chart review, pt is AAOx4, on room air. This RN CM called pt's room and mobile multiple times, unable to contact. Voalte message sent to bedside RN to help facilitate call to discuss discharge planning needs.    Barriers to Discharge: None at this time.    Plan: Will continue to follow and assist with discharge planning needs.

## 2020-10-20 NOTE — DISCHARGE INSTRUCTIONS
Discharge Instructions    Discharged to home by car with relative. Discharged via wheelchair, hospital escort: Yes.  Special equipment needed: Not Applicable    Be sure to schedule a follow-up appointment with your primary care doctor or any specialists as instructed.     Discharge Plan:   Influenza Vaccine Indication: Patient Refuses    I understand that a diet low in cholesterol, fat, and sodium is recommended for good health. Unless I have been given specific instructions below for another diet, I accept this instruction as my diet prescription.   Other diet: reg    Special Instructions: None    · Is patient discharged on Warfarin / Coumadin?   No     Depression / Suicide Risk    As you are discharged from this [facility] facility, it is important to learn how to keep safe from harming yourself.    Recognize the warning signs:  · Abrupt changes in personality, positive or negative- including increase in energy   · Giving away possessions  · Change in eating patterns- significant weight changes-  positive or negative  · Change in sleeping patterns- unable to sleep or sleeping all the time   · Unwillingness or inability to communicate  · Depression  · Unusual sadness, discouragement and loneliness  · Talk of wanting to die  · Neglect of personal appearance   · Rebelliousness- reckless behavior  · Withdrawal from people/activities they love  · Confusion- inability to concentrate     If you or a loved one observes any of these behaviors or has concerns about self-harm, here's what you can do:  · Talk about it- your feelings and reasons for harming yourself  · Remove any means that you might use to hurt yourself (examples: pills, rope, extension cords, firearm)  · Get professional help from the community (Mental Health, Substance Abuse, psychological counseling)  · Do not be alone:Call your Safe Contact- someone whom you trust who will be there for you.  · Call your local CRISIS HOTLINE 602-8732 or  115-229-7869  · Call your local Children's Mobile Crisis Response Team [place] (501) 345-8458 or www.About[employer]Work.[employer]  · Call the toll free National Suicide Prevention Hotlines   · National Suicide Prevention Lifeline 030-432-XGOY (3881)  · National [employer] Line Network 800-SUICIDE (129-1952)

## 2020-10-20 NOTE — PROGRESS NOTES
Patient received discharge instructions at bedside. RN also updated samir Castellanos about discharged and new medications. No further concerns.     Son will .

## 2020-10-23 LAB
BACTERIA BLD CULT: NORMAL
BACTERIA BLD CULT: NORMAL
SIGNIFICANT IND 70042: NORMAL
SIGNIFICANT IND 70042: NORMAL
SITE SITE: NORMAL
SITE SITE: NORMAL
SOURCE SOURCE: NORMAL
SOURCE SOURCE: NORMAL

## 2021-03-15 DIAGNOSIS — Z23 NEED FOR VACCINATION: ICD-10-CM

## 2022-08-27 ENCOUNTER — HOSPITAL ENCOUNTER (OUTPATIENT)
Dept: RADIOLOGY | Facility: MEDICAL CENTER | Age: 57
End: 2022-08-27
Attending: NURSE PRACTITIONER
Payer: COMMERCIAL

## 2022-08-27 ENCOUNTER — OFFICE VISIT (OUTPATIENT)
Dept: URGENT CARE | Facility: PHYSICIAN GROUP | Age: 57
End: 2022-08-27
Payer: COMMERCIAL

## 2022-08-27 VITALS
SYSTOLIC BLOOD PRESSURE: 122 MMHG | TEMPERATURE: 96.3 F | HEIGHT: 67 IN | DIASTOLIC BLOOD PRESSURE: 64 MMHG | BODY MASS INDEX: 26.68 KG/M2 | WEIGHT: 170 LBS

## 2022-08-27 DIAGNOSIS — S69.92XA INJURY OF FINGER OF LEFT HAND, INITIAL ENCOUNTER: ICD-10-CM

## 2022-08-27 DIAGNOSIS — S63.259A FINGER DISLOCATION, INITIAL ENCOUNTER: ICD-10-CM

## 2022-08-27 PROCEDURE — 73140 X-RAY EXAM OF FINGER(S): CPT | Mod: LT

## 2022-08-27 PROCEDURE — 26770 TREAT FINGER DISLOCATION: CPT | Mod: F1 | Performed by: NURSE PRACTITIONER

## 2022-08-27 ASSESSMENT — ENCOUNTER SYMPTOMS
SENSORY CHANGE: 0
WEAKNESS: 0
MYALGIAS: 1
BRUISES/BLEEDS EASILY: 0
TINGLING: 0
CHILLS: 0
FEVER: 0

## 2022-08-27 ASSESSMENT — FIBROSIS 4 INDEX: FIB4 SCORE: 0.81

## 2022-08-27 ASSESSMENT — PAIN SCALES - GENERAL: PAINLEVEL: 4=SLIGHT-MODERATE PAIN

## 2022-08-27 NOTE — PROGRESS NOTES
"Subjective     Raymon Damico is a 57 y.o. female who presents with Finger Injury (Pt states [place] water park today [place]22 and slipped and injured finger)            HPI   was at [facility] park in [facility] and fell onto her hands.   had deformity at left index finger.  Went to first-aid and had finger splint placed.  States her finger no longer looks out of place.  Wants to make sure it is not broken or dislocated.  Denies any tingling, numbness or weakness in finger.    Review of Systems   Constitutional:  Negative for chills, fever and malaise/fatigue.   Musculoskeletal:  Positive for joint pain and myalgias.   Skin:  Negative for itching and rash.   Neurological:  Negative for tingling, sensory change and weakness.   Endo/Heme/Allergies:  Does not bruise/bleed easily.   All other systems reviewed and are negative.           Objective     /64 (BP Location: Left arm, Patient Position: Sitting, BP Cuff Size: Adult)   Pulse (P) 64   Temp (!) 35.7 °C (96.3 °F) (Temporal)   Ht 1.689 m (5' 6.5\")   Wt 77.1 kg (170 lb)   SpO2 (P) 100%   BMI 27.03 kg/m²      Physical Exam  Vitals reviewed.   Constitutional:       General: She is awake. She is not in acute distress.     Appearance: Normal appearance. She is well-developed. She is not ill-appearing, toxic-appearing or diaphoretic.   HENT:      Head: Normocephalic.   Cardiovascular:      Rate and Rhythm: Normal rate.   Pulmonary:      Effort: Pulmonary effort is normal.   Musculoskeletal:      Left hand: Tenderness and bony tenderness present. No swelling, deformity or lacerations. Normal range of motion. Normal strength. Normal sensation. There is no disruption of two-point discrimination. Normal capillary refill. Normal pulse.      Comments: No deformity seen at left index finger.  Tenderness on palpation at 1st distal phalangeal joint. Procedure: reduced left index finger without numbing agent per patient request. Patient states finger feels " better and can bend better now at tip. Declines repeat xray for verification of reduction. Med assist applied finger splint.   Skin:     General: Skin is warm and dry.      Findings: Signs of injury present. No abrasion, bruising, ecchymosis, erythema, laceration, rash or wound.   Neurological:      Mental Status: She is alert and oriented to person, place, and time.   Psychiatric:         Behavior: Behavior is cooperative.                           Assessment & Plan        1. Injury of finger of left hand, initial encounter    - DX-FINGER(S) 2+ LEFT; Future  - Referral to Sports Medicine    2. Finger dislocation, initial encounter    - Referral to Sports Medicine      Patient declines repeat xray after reduction by provider  -May use over the counter NSAID as needed for pain/swelling  -May use cool compresses for swelling as needed  -May utilize RICE method as needed, use finger splint for at least 1 week consistently   -Avoid excessive weight lifting/grasping to avoid reinjury  -May apply topical analgesics as needed   -Perform proper body mechanics with lift/carry, push/pull  -Monitor for deformity, numbness/tingling in finger, decreased range of motion with lifting difficulty- need re-evaluation  Follow up with Sports Med